# Patient Record
Sex: MALE | Race: WHITE | Employment: FULL TIME | URBAN - METROPOLITAN AREA
[De-identification: names, ages, dates, MRNs, and addresses within clinical notes are randomized per-mention and may not be internally consistent; named-entity substitution may affect disease eponyms.]

---

## 2019-10-15 PROCEDURE — 88305 TISSUE EXAM BY PATHOLOGIST: CPT | Performed by: INTERNAL MEDICINE

## 2020-01-07 ENCOUNTER — HOSPITAL ENCOUNTER (OUTPATIENT)
Dept: CT IMAGING | Age: 55
Discharge: HOME | End: 2020-01-07
Attending: INTERNAL MEDICINE

## 2020-01-07 DIAGNOSIS — Z13.6 SCREENING FOR HEART DISEASE: ICD-10-CM

## 2020-01-09 NOTE — PROGRESS NOTES
Your coronary calcium score is zero which is the best it can be. Your risk of heart disease is very low.

## 2020-01-20 PROBLEM — N45.1 LEFT EPIDIDYMITIS: Status: ACTIVE | Noted: 2020-01-20

## 2020-01-20 PROBLEM — I86.1 LEFT VARICOCELE: Status: ACTIVE | Noted: 2020-01-20

## 2020-01-20 PROBLEM — N40.1 ENLARGED PROSTATE WITH LOWER URINARY TRACT SYMPTOMS (LUTS): Status: ACTIVE | Noted: 2020-01-20

## 2020-06-18 PROBLEM — M25.512 CHRONIC LEFT SHOULDER PAIN: Status: ACTIVE | Noted: 2020-06-18

## 2020-06-18 PROBLEM — G89.29 CHRONIC LEFT SHOULDER PAIN: Status: ACTIVE | Noted: 2020-06-18

## 2020-07-08 PROBLEM — I10 ESSENTIAL HYPERTENSION: Status: ACTIVE | Noted: 2020-07-08

## 2020-12-17 PROCEDURE — 88305 TISSUE EXAM BY PATHOLOGIST: CPT | Performed by: INTERNAL MEDICINE

## 2021-11-04 PROBLEM — N45.1 LEFT EPIDIDYMITIS: Status: RESOLVED | Noted: 2020-01-20 | Resolved: 2021-11-04

## 2021-11-04 PROBLEM — N40.1 ENLARGED PROSTATE WITH LOWER URINARY TRACT SYMPTOMS (LUTS): Status: RESOLVED | Noted: 2020-01-20 | Resolved: 2021-11-04

## 2021-11-04 PROBLEM — I10 ESSENTIAL HYPERTENSION: Status: RESOLVED | Noted: 2020-07-08 | Resolved: 2021-11-04

## 2021-11-04 PROBLEM — K22.70 BARRETT'S ESOPHAGUS WITHOUT DYSPLASIA: Status: ACTIVE | Noted: 2021-11-04

## 2021-11-04 PROBLEM — I86.1 LEFT VARICOCELE: Status: RESOLVED | Noted: 2020-01-20 | Resolved: 2021-11-04

## 2022-04-29 ENCOUNTER — HOSPITAL ENCOUNTER (EMERGENCY)
Facility: HOSPITAL | Age: 57
Discharge: HOME | End: 2022-04-29
Attending: EMERGENCY MEDICINE
Payer: COMMERCIAL

## 2022-04-29 VITALS
HEART RATE: 74 BPM | OXYGEN SATURATION: 95 % | WEIGHT: 198 LBS | RESPIRATION RATE: 17 BRPM | SYSTOLIC BLOOD PRESSURE: 147 MMHG | BODY MASS INDEX: 28.35 KG/M2 | HEIGHT: 70 IN | TEMPERATURE: 98 F | DIASTOLIC BLOOD PRESSURE: 94 MMHG

## 2022-04-29 DIAGNOSIS — R07.89 CHEST PAIN, ATYPICAL: ICD-10-CM

## 2022-04-29 DIAGNOSIS — R03.0 ELEVATED BLOOD PRESSURE READING WITHOUT DIAGNOSIS OF HYPERTENSION: Primary | ICD-10-CM

## 2022-04-29 LAB
ANION GAP SERPL CALC-SCNC: 7 MMOL/L (ref 0–18)
BASOPHILS # BLD AUTO: 0.05 X10(3) UL (ref 0–0.2)
BASOPHILS NFR BLD AUTO: 0.7 %
BUN BLD-MCNC: 15 MG/DL (ref 7–18)
BUN/CREAT SERPL: 12.5 (ref 10–20)
CALCIUM BLD-MCNC: 9.7 MG/DL (ref 8.5–10.1)
CHLORIDE SERPL-SCNC: 104 MMOL/L (ref 98–112)
CO2 SERPL-SCNC: 28 MMOL/L (ref 21–32)
CREAT BLD-MCNC: 1.2 MG/DL
DEPRECATED RDW RBC AUTO: 47 FL (ref 35.1–46.3)
EOSINOPHIL # BLD AUTO: 0.14 X10(3) UL (ref 0–0.7)
EOSINOPHIL NFR BLD AUTO: 1.9 %
ERYTHROCYTE [DISTWIDTH] IN BLOOD BY AUTOMATED COUNT: 14 % (ref 11–15)
GLUCOSE BLD-MCNC: 95 MG/DL (ref 70–99)
HCT VFR BLD AUTO: 46.2 %
HGB BLD-MCNC: 15 G/DL
IMM GRANULOCYTES # BLD AUTO: 0.02 X10(3) UL (ref 0–1)
IMM GRANULOCYTES NFR BLD: 0.3 %
LYMPHOCYTES # BLD AUTO: 2.27 X10(3) UL (ref 1–4)
LYMPHOCYTES NFR BLD AUTO: 31.4 %
MCH RBC QN AUTO: 29.6 PG (ref 26–34)
MCHC RBC AUTO-ENTMCNC: 32.5 G/DL (ref 31–37)
MCV RBC AUTO: 91.3 FL
MONOCYTES # BLD AUTO: 0.68 X10(3) UL (ref 0.1–1)
MONOCYTES NFR BLD AUTO: 9.4 %
NEUTROPHILS # BLD AUTO: 4.08 X10 (3) UL (ref 1.5–7.7)
NEUTROPHILS # BLD AUTO: 4.08 X10(3) UL (ref 1.5–7.7)
NEUTROPHILS NFR BLD AUTO: 56.3 %
OSMOLALITY SERPL CALC.SUM OF ELEC: 289 MOSM/KG (ref 275–295)
PLATELET # BLD AUTO: 194 10(3)UL (ref 150–450)
POTASSIUM SERPL-SCNC: 3.6 MMOL/L (ref 3.5–5.1)
RBC # BLD AUTO: 5.06 X10(6)UL
SODIUM SERPL-SCNC: 139 MMOL/L (ref 136–145)
TROPONIN I HIGH SENSITIVITY: 4 NG/L
TROPONIN I HIGH SENSITIVITY: 4 NG/L
WBC # BLD AUTO: 7.2 X10(3) UL (ref 4–11)

## 2022-04-29 PROCEDURE — 93010 ELECTROCARDIOGRAM REPORT: CPT | Performed by: EMERGENCY MEDICINE

## 2022-04-29 PROCEDURE — 84484 ASSAY OF TROPONIN QUANT: CPT | Performed by: EMERGENCY MEDICINE

## 2022-04-29 PROCEDURE — 80048 BASIC METABOLIC PNL TOTAL CA: CPT | Performed by: EMERGENCY MEDICINE

## 2022-04-29 PROCEDURE — 85025 COMPLETE CBC W/AUTO DIFF WBC: CPT | Performed by: EMERGENCY MEDICINE

## 2022-04-29 PROCEDURE — 96361 HYDRATE IV INFUSION ADD-ON: CPT

## 2022-04-29 PROCEDURE — 99285 EMERGENCY DEPT VISIT HI MDM: CPT

## 2022-04-29 PROCEDURE — 93005 ELECTROCARDIOGRAM TRACING: CPT

## 2022-04-29 PROCEDURE — 96374 THER/PROPH/DIAG INJ IV PUSH: CPT

## 2022-04-29 RX ORDER — LABETALOL HYDROCHLORIDE 5 MG/ML
10 INJECTION, SOLUTION INTRAVENOUS ONCE
Status: COMPLETED | OUTPATIENT
Start: 2022-04-29 | End: 2022-04-29

## 2022-04-29 NOTE — ED INITIAL ASSESSMENT (HPI)
Pt reports having some Chest pain and high BP that started yesterday. Pt went to his doctor today and was told to come to the ER for more test. Pain of 5/10. Pt states he never had any problems with his BP.

## 2022-10-06 ENCOUNTER — APPOINTMENT (OUTPATIENT)
Dept: CT IMAGING | Facility: HOSPITAL | Age: 57
End: 2022-10-06
Attending: EMERGENCY MEDICINE
Payer: COMMERCIAL

## 2022-10-06 ENCOUNTER — HOSPITAL ENCOUNTER (EMERGENCY)
Facility: HOSPITAL | Age: 57
Discharge: HOME | End: 2022-10-06
Attending: EMERGENCY MEDICINE
Payer: COMMERCIAL

## 2022-10-06 ENCOUNTER — HOSPITAL ENCOUNTER (OUTPATIENT)
Age: 57
Discharge: ANOTHER HOSPITAL | End: 2022-10-06
Payer: COMMERCIAL

## 2022-10-06 VITALS
HEART RATE: 109 BPM | TEMPERATURE: 98 F | RESPIRATION RATE: 16 BRPM | SYSTOLIC BLOOD PRESSURE: 149 MMHG | OXYGEN SATURATION: 100 % | DIASTOLIC BLOOD PRESSURE: 96 MMHG

## 2022-10-06 VITALS
BODY MASS INDEX: 27.35 KG/M2 | TEMPERATURE: 98 F | RESPIRATION RATE: 18 BRPM | HEART RATE: 98 BPM | WEIGHT: 191 LBS | HEIGHT: 70 IN | DIASTOLIC BLOOD PRESSURE: 97 MMHG | OXYGEN SATURATION: 99 % | SYSTOLIC BLOOD PRESSURE: 157 MMHG

## 2022-10-06 DIAGNOSIS — R51.9 ACUTE NONINTRACTABLE HEADACHE, UNSPECIFIED HEADACHE TYPE: ICD-10-CM

## 2022-10-06 DIAGNOSIS — H53.8 BLURRED VISION: ICD-10-CM

## 2022-10-06 DIAGNOSIS — R03.0 ELEVATED BLOOD PRESSURE READING: ICD-10-CM

## 2022-10-06 DIAGNOSIS — I10 PRIMARY HYPERTENSION: Primary | ICD-10-CM

## 2022-10-06 DIAGNOSIS — R51.9 ACUTE NONINTRACTABLE HEADACHE, UNSPECIFIED HEADACHE TYPE: Primary | ICD-10-CM

## 2022-10-06 PROCEDURE — 70450 CT HEAD/BRAIN W/O DYE: CPT | Performed by: EMERGENCY MEDICINE

## 2022-10-06 PROCEDURE — 93005 ELECTROCARDIOGRAM TRACING: CPT

## 2022-10-06 PROCEDURE — 99284 EMERGENCY DEPT VISIT MOD MDM: CPT

## 2022-10-06 PROCEDURE — 93010 ELECTROCARDIOGRAM REPORT: CPT | Performed by: EMERGENCY MEDICINE

## 2022-10-06 PROCEDURE — 99214 OFFICE O/P EST MOD 30 MIN: CPT | Performed by: NURSE PRACTITIONER

## 2022-10-06 RX ORDER — AMLODIPINE BESYLATE 5 MG/1
5 TABLET ORAL DAILY
COMMUNITY
Start: 2022-09-12

## 2022-10-06 NOTE — ED INITIAL ASSESSMENT (HPI)
Pt presents to the ER from [address] [place] [address] for c/o HA, dizziness, and high blood pressure x 1 month

## 2022-10-06 NOTE — ED INITIAL ASSESSMENT (HPI)
Patient presents a&o 4/4 c/o HA, blurry visio that pt noticed ~ 2 hours ago while at dentist. States dentist took BP and it was [id number]. states there is mild improvement in symptoms. Denies having diabetes.

## 2023-03-24 ENCOUNTER — OFFICE VISIT (OUTPATIENT)
Dept: OTOLARYNGOLOGY | Facility: CLINIC | Age: 58
End: 2023-03-24

## 2023-03-24 ENCOUNTER — OFFICE VISIT (OUTPATIENT)
Dept: AUDIOLOGY | Facility: CLINIC | Age: 58
End: 2023-03-24

## 2023-03-24 VITALS — HEIGHT: 70 IN | BODY MASS INDEX: 28.63 KG/M2 | WEIGHT: 200 LBS

## 2023-03-24 DIAGNOSIS — H93.13 TINNITUS, BILATERAL: Primary | ICD-10-CM

## 2023-03-24 DIAGNOSIS — H93.13 TINNITUS OF BOTH EARS: Primary | ICD-10-CM

## 2023-03-24 DIAGNOSIS — M26.609 TMJ (TEMPOROMANDIBULAR JOINT DISORDER): ICD-10-CM

## 2023-03-24 PROCEDURE — 92557 COMPREHENSIVE HEARING TEST: CPT | Performed by: AUDIOLOGIST

## 2023-03-24 PROCEDURE — 99203 OFFICE O/P NEW LOW 30 MIN: CPT | Performed by: STUDENT IN AN ORGANIZED HEALTH CARE EDUCATION/TRAINING PROGRAM

## 2023-03-24 PROCEDURE — 3008F BODY MASS INDEX DOCD: CPT | Performed by: STUDENT IN AN ORGANIZED HEALTH CARE EDUCATION/TRAINING PROGRAM

## 2023-03-24 PROCEDURE — 92504 EAR MICROSCOPY EXAMINATION: CPT | Performed by: STUDENT IN AN ORGANIZED HEALTH CARE EDUCATION/TRAINING PROGRAM

## 2023-03-24 PROCEDURE — 92567 TYMPANOMETRY: CPT | Performed by: AUDIOLOGIST

## 2023-03-24 RX ORDER — TADALAFIL 5 MG/1
5 TABLET ORAL
COMMUNITY
Start: 2022-12-28 | End: 2023-03-28

## 2023-03-24 RX ORDER — ROSUVASTATIN CALCIUM 5 MG/1
5 TABLET, COATED ORAL NIGHTLY
COMMUNITY
Start: 2022-10-14

## 2023-03-24 RX ORDER — TAMSULOSIN HYDROCHLORIDE 0.4 MG/1
0.4 CAPSULE ORAL EVERY EVENING
Qty: 30 CAPSULE | Refills: 2 | COMMUNITY
Start: 2022-12-28 | End: 2023-03-28

## 2023-03-24 RX ORDER — LOSARTAN POTASSIUM 50 MG/1
50 TABLET ORAL DAILY
COMMUNITY
Start: 2022-10-14

## 2023-06-30 ENCOUNTER — OFFICE VISIT (OUTPATIENT)
Dept: OTOLARYNGOLOGY | Facility: CLINIC | Age: 58
End: 2023-06-30

## 2023-06-30 DIAGNOSIS — H93.13 TINNITUS OF BOTH EARS: Primary | ICD-10-CM

## 2023-06-30 DIAGNOSIS — M26.609 TMJ (TEMPOROMANDIBULAR JOINT DISORDER): ICD-10-CM

## 2023-06-30 PROCEDURE — 99213 OFFICE O/P EST LOW 20 MIN: CPT | Performed by: OTOLARYNGOLOGY

## 2023-06-30 RX ORDER — CELECOXIB 200 MG/1
200 CAPSULE ORAL DAILY PRN
Qty: 30 CAPSULE | Refills: 0 | Status: SHIPPED | OUTPATIENT
Start: 2023-06-30 | End: 2023-07-30

## 2023-06-30 RX ORDER — CYCLOBENZAPRINE HCL 5 MG
5 TABLET ORAL NIGHTLY
Qty: 30 TABLET | Refills: 1 | Status: SHIPPED | OUTPATIENT
Start: 2023-06-30

## 2024-01-09 ENCOUNTER — ORDER TRANSCRIPTION (OUTPATIENT)
Dept: ADMINISTRATIVE | Facility: HOSPITAL | Age: 59
End: 2024-01-09

## 2024-01-09 DIAGNOSIS — Z13.9 ENCOUNTER FOR SCREENING: Primary | ICD-10-CM

## 2024-01-23 ENCOUNTER — HOSPITAL ENCOUNTER (OUTPATIENT)
Dept: CT IMAGING | Age: 59
Discharge: HOME | End: 2024-01-23
Attending: INTERNAL MEDICINE

## 2024-01-23 DIAGNOSIS — Z13.9 ENCOUNTER FOR SCREENING: ICD-10-CM

## 2024-01-23 NOTE — PROGRESS NOTES
Date of Service 2024    ASHELY CASANOVA  Date of Birth 1965    Patient Age: 58 year old    PCP: Naye Vargas MD  [address] [facility]  [address]  [place] [address]    Heart Scan Consult  Preliminary Heart Scan Score: 0    Previous Screening  Heart Scan Completed Previously: Yes  Year of last heart scan: 2020  Score of last heart scan: 0  Peripheral Vascular Scan Completed Previously: No          Risk Factors  Personal Risk Factors  Non-alterable Risk Factors: Family History (Father  at 53 yo from MI.)  Alterable Risk Factors: High Blood Pressure;Abnormal Cholesterol;Lack of exercise      Body Mass Index  There is no height or weight on file to calculate BMI. BMI 28    Blood Pressure  Blood Pressure measurement declined during this encounter.  He is on medication.  (Normal =< 120/80,  Elevated = 120-129/ >80,  High Stage1 130-139/80-89 , Stage2 >140/>90)    Lipid Profile  Cholesterol: 188, done on 2021.  HDL Cholesterol: 47, done on 2021.  LDL Cholesterol: 118, done on 2021.  TriGlycerides 115, done on 2021.    23  Total - 131  LDL - 58  HDL - 49  Triglycerides - 120  He is on medication.    Cholesterol Goals  Value   Total  =< 200   HDL  = > 45 Men = > 55 Women   LDL   =< 100   Triglycerides  =< 150       Glucose and Hemoglobin A1C  Lab Results   Component Value Date    GLU 95 2022     (Normal Fasting Glucose < 100mg/dl )    Nurse Review  Risk factor information and results reviewed with Nurse: Yes    Recommended Follow Up:  Consult your physician regarding:: Final Heart Scan Report;Discuss potential for Incidental Finding      Recommendations for Change:  Nutrition Changes: Low Saturated Fat    Cholesterol Modification (goal of therapy depends upon your risk): No Change Needed    Exercise: Initiate Program    Smoking Cessation: No Change Needed    Weight Management: Decrease Current Weight    Stress Management: Adopt Stress Management Techniques    Repeat Heart  Scan: 5 years if Calcium Score is 0.0;Discuss with your Physician              [place] Recommended Resources:  Recommended Resources: PV Screening;Upcoming Classes, Medical Services and Health Library [email or web address]  Recommended PV Screening: Abdomen;Ankle-Brachial Index (IRVING);Carotids         Antonella COFFMAN RN        Please Contact the Nurse Heart Line with any Questions or Concerns [phone].

## 2024-02-15 ENCOUNTER — OFFICE VISIT (OUTPATIENT)
Dept: OTOLARYNGOLOGY | Facility: CLINIC | Age: 59
End: 2024-02-15

## 2024-02-15 VITALS — WEIGHT: 200 LBS | BODY MASS INDEX: 29 KG/M2 | TEMPERATURE: 98 F

## 2024-02-15 DIAGNOSIS — R09.82 POSTNASAL DISCHARGE: ICD-10-CM

## 2024-02-15 DIAGNOSIS — M54.2 NECK PAIN: Primary | ICD-10-CM

## 2024-02-15 PROCEDURE — 99213 OFFICE O/P EST LOW 20 MIN: CPT | Performed by: OTOLARYNGOLOGY

## 2024-02-15 RX ORDER — LORATADINE 10 MG/1
10 TABLET ORAL DAILY
Qty: 30 TABLET | Refills: 3 | Status: SHIPPED | OUTPATIENT
Start: 2024-02-15

## 2024-02-15 RX ORDER — MONTELUKAST SODIUM 10 MG/1
10 TABLET ORAL NIGHTLY
Qty: 30 TABLET | Refills: 3 | Status: SHIPPED | OUTPATIENT
Start: 2024-02-15

## 2024-02-15 RX ORDER — FLUTICASONE PROPIONATE 50 MCG
1 SPRAY, SUSPENSION (ML) NASAL 2 TIMES DAILY
Qty: 16 G | Refills: 3 | Status: SHIPPED | OUTPATIENT
Start: 2024-02-15

## 2024-02-15 NOTE — PROGRESS NOTES
Everton Cueva is a 58 year old male.    Chief Complaint   Patient presents with    Consult     Pt concerned with sinus irritation, pain in throat for a few weeks on and off.    Ear Problem     On going ringing.       HISTORY OF PRESENT ILLNESS  2012 1.  sinus symptoms (chronic)      She presents with a 1-1.5 year history of buzzing in bothe ears that comes and goes especially at night. No associated vertigo or hearingloss. She has been on z paks for sinus symptoms and is now starting to feel back to normal. Tinnitus does not appear to be affected by improvement in her sinuses.  Audiogram reveals lo normal/mild sensorineural hearing loss wih an asymmetry.   2.  ringing in ears      6/30/23 he presents with ringing sensation in his ears for the past 6 to 7 months.  Seen by Dr. COBB suspected to have TMJ issues.  Presents with continued ear discomfort headaches and ringing in his ears.  He is looking into getting a bite guard and is waiting to get it from his dentist.  Not using any medications at this time.  No other signs, symptoms or complaints.  Previous audiogram performed in March demonstrated a mild upsloping to normal downsloping to mild sensorineural hearing loss bilaterally.     2/15/24 he presents today with complaints of ear discomfort neck discomfort bilaterally thinking he has lymph nodes in his neck.  Also chronic rhinorrhea and postnasal discharge.  No other signs, symptoms or complaint      Social History     Socioeconomic History    Marital status:    Tobacco Use    Smoking status: Never    Smokeless tobacco: Never   Vaping Use    Vaping Use: Never used   Substance and Sexual Activity    Alcohol use: Yes     Comment: social    Drug use: Never       Family History   Problem Relation Age of Onset    Heart Attack Father        Past Medical History:   Diagnosis Date    Essential hypertension 7/8/2020    Left varicocele 1/20/2020       Past Surgical History:   Procedure Laterality Date    UPPER GI  ENDOSCOPY,BIOPSY  10/2019    White's; esophagitis- repeat EGD in 1 yr    UPPER GI ENDOSCOPY,EXAM  12/2020    White's - repeat 3 yrs         REVIEW OF SYSTEMS    System Neg/Pos Details   Constitutional Negative Fatigue, fever and weight loss.   ENMT Negative Drooling.   Eyes Negative Blurred vision and vision changes.   Respiratory Negative Dyspnea and wheezing.   Cardio Negative Chest pain, irregular heartbeat/palpitations and syncope.   GI Negative Abdominal pain and diarrhea.   Endocrine Negative Cold intolerance and heat intolerance.   Neuro Negative Tremors.   Psych Negative Anxiety and depression.   Integumentary Negative Frequent skin infections, pigment change and rash.   Hema/Lymph Negative Easy bleeding and easy bruising.           PHYSICAL EXAM    Temp 98.2 °F (36.8 °C) (Tympanic)   Wt 200 lb (90.7 kg)   BMI 28.70 kg/m²        Constitutional Normal Overall appearance - Normal.   Psychiatric Normal Orientation - Oriented to time, place, person & situation. Appropriate mood and affect.   Neck Exam Normal Inspection - Normal. Palpation - Normal. Parotid gland - Normal. Thyroid gland - Normal.   Eyes Normal Conjunctiva - Right: Normal, Left: Normal. Pupil - Right: Normal, Left: Normal. Fundus - Right: Normal, Left: Normal.   Neurological Normal Memory - Normal. Cranial nerves - Cranial nerves II through XII grossly intact.   Head/Face Normal Facial features - Normal. Eyebrows - Normal. Skull - Normal.        Nasopharynx Normal External nose - Normal. Lips/teeth/gums - Normal. Tonsils - Normal. Oropharynx - Normal.   Ears Normal Inspection - Right: Normal, Left: Normal. Canal - Right: Normal, Left: Normal. TM - Right: Normal, Left: Normal.   Skin Normal Inspection - Normal.        Lymph Detail Normal Submental. Submandibular. Anterior cervical. Posterior cervical. Supraclavicular.        Nose/Mouth/Throat Normal External nose - Normal. Lips/teeth/gums - Normal. Tonsils - Normal. Oropharynx postnasal  discharge with erythema   Nose/Mouth/Throat Normal Nares - Right: Normal Left: Normal. Septum -Normal  Turbinates - Right: Normal, Left: Normal.  Mucosa congested       Current Outpatient Medications:     montelukast 10 MG Oral Tab, Take 1 tablet (10 mg total) by mouth nightly., Disp: 30 tablet, Rfl: 3    loratadine 10 MG Oral Tab, Take 1 tablet (10 mg total) by mouth daily., Disp: 30 tablet, Rfl: 3    fluticasone propionate 50 MCG/ACT Nasal Suspension, 1 spray by Nasal route 2 (two) times daily., Disp: 16 g, Rfl: 3    cyclobenzaprine 5 MG Oral Tab, Take 1 tablet (5 mg total) by mouth nightly., Disp: 30 tablet, Rfl: 1    losartan 50 MG Oral Tab, Take 1 tablet (50 mg total) by mouth daily., Disp: , Rfl:     rosuvastatin 5 MG Oral Tab, Take 1 tablet (5 mg total) by mouth nightly., Disp: , Rfl:     amLODIPine 5 MG Oral Tab, Take 5 mg by mouth daily. (Patient not taking: Reported on 3/24/2023), Disp: , Rfl:     omeprazole 20 MG Oral Capsule Delayed Release, Take 1 capsule (20 mg total) by mouth every morning. (Patient not taking: Reported on 3/24/2023), Disp: 90 capsule, Rfl: 0    azithromycin (ZITHROMAX Z-JUDSON) 250 MG Oral Tab, Take two tablets today, then one tablet daily for 4 more days (Patient not taking: Reported on 4/29/2022), Disp: 6 tablet, Rfl: 0  ASSESSMENT AND PLAN    1. Neck pain    2. Postnasal discharge  Neck pain appears to be musculoskeletal.  Long history of TMJ issues.  Has been using his bite guard which is new and consistently.  I did ask him to use it every day for a month or 2 to see if this helps with some of his ear issues and neck pain.  He politely declined cyclobenzaprine and Celebrex.  I will start him on Singulair loratadine fluticasone for his congestive issues.        This note was prepared using Dragon Medical voice recognition dictation software. As a result errors may occur. When identified these errors have been corrected. While every attempt is made to correct errors during dictation  discrepancies may still exist    Brendan Jimenez MD    2/15/2024    3:28 PM

## 2024-09-05 LAB — ANA SER QL IF: NEGATIVE

## 2025-02-11 ENCOUNTER — OFFICE VISIT (OUTPATIENT)
Age: 60
End: 2025-02-11
Payer: COMMERCIAL

## 2025-02-11 DIAGNOSIS — M47.816 LUMBAR SPONDYLOSIS: ICD-10-CM

## 2025-02-11 DIAGNOSIS — M54.16 LUMBAR RADICULOPATHY: ICD-10-CM

## 2025-02-11 DIAGNOSIS — M48.061 SPINAL STENOSIS OF LUMBAR REGION, UNSPECIFIED WHETHER NEUROGENIC CLAUDICATION PRESENT: Primary | ICD-10-CM

## 2025-02-11 RX ORDER — TRAZODONE HYDROCHLORIDE 100 MG/1
100 TABLET ORAL NIGHTLY
COMMUNITY
Start: 2024-10-21 | End: 2025-10-16

## 2025-02-11 RX ORDER — METHOCARBAMOL 500 MG/1
500 TABLET, FILM COATED ORAL 2 TIMES DAILY PRN
Qty: 30 TABLET | Refills: 0 | Status: SHIPPED | OUTPATIENT
Start: 2025-02-11

## 2025-02-11 NOTE — PROGRESS NOTES
Patient: Everton Cueva  Medical Record Number: [id number]  Site: [facility]  Referring Physician:  No ref. provider found  PCP: Naye Vargas MD        HISTORY OF CHIEF COMPLAINT:    Everton Cueva is a 59 year old male, who complains of a long history of non radiating LBP.  Denies saddle anesthesia or incontinence.  Patient has a long history of non radiating back pain.  His pain was very bad over the fall he tried physical therapy and had no relief.  Then his pain improved in January 2025.  8 days ago he was cutting a bunch of meat at a  shop with friends and since then has had left buttock pain down the posterior thigh to his shin.  He has no numbness.  No weakness.  He states the pain going down the leg is fairly constant.  He has not had any recent treatment the last 8 days.  VAS Pain Score: 3 /10          Past Medical History:    Essential hypertension    Left varicocele      Past Surgical History:   Procedure Laterality Date    Upper gi endoscopy,biopsy  10/2019    White's; esophagitis- repeat EGD in 1 yr    Upper gi endoscopy,exam  12/2020    White's - repeat 3 yrs      Family History   Problem Relation Age of Onset    Heart Attack Father       Social History     Socioeconomic History    Marital status:    Tobacco Use    Smoking status: Never    Smokeless tobacco: Never   Vaping Use    Vaping status: Never Used   Substance and Sexual Activity    Alcohol use: Yes     Comment: social - rarely 1 per week    Drug use: Never      Current Medications:  Current Outpatient Medications   Medication Sig Dispense Refill    traZODone 100 MG Oral Tab Take 1 tablet (100 mg total) by mouth nightly.      methocarbamol 500 MG Oral Tab Take 1 tablet (500 mg total) by mouth 2 (two) times daily as needed. 30 tablet 0    fluticasone propionate 50 MCG/ACT Nasal Suspension 1 spray by Nasal route 2 (two) times daily. 16 g 3    losartan 50 MG Oral Tab Take 1 tablet (50 mg total) by mouth daily.       rosuvastatin 5 MG Oral Tab Take 1 tablet (5 mg total) by mouth nightly.      omeprazole 20 MG Oral Capsule Delayed Release Take 1 capsule (20 mg total) by mouth every morning. 90 capsule 0    montelukast 10 MG Oral Tab Take 1 tablet (10 mg total) by mouth nightly. (Patient not taking: Reported on 2/11/2025) 30 tablet 3    loratadine 10 MG Oral Tab Take 1 tablet (10 mg total) by mouth daily. (Patient not taking: Reported on 2/11/2025) 30 tablet 3    cyclobenzaprine 5 MG Oral Tab Take 1 tablet (5 mg total) by mouth nightly. (Patient not taking: Reported on 2/11/2025) 30 tablet 1    amLODIPine 5 MG Oral Tab Take 5 mg by mouth daily. (Patient not taking: Reported on 2/11/2025)      azithromycin (ZITHROMAX Z-JUDSON) 250 MG Oral Tab Take two tablets today, then one tablet daily for 4 more days (Patient not taking: Reported on 2/11/2025) 6 tablet 0        Work History:  The patient currently is a retired .        IMAGING STUDIES:   Images were reviewed and discussed with the patient.  They voiced understanding of what the images showed.      IMPRESSION:   .   1. 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   2. Spondylotic and degenerative disc changes as described in detail above resulting in   *  bilateral inferior foraminal stenosis L3-L4,   *  mild to moderate central canal, bilateral subarticular and bilateral mild AP foraminal stenosis   greater on the left L4-L5.     PHYSICAL EXAMIMATION   PHYSICAL EXAMINATION: Everton Cueva is a 59 year old   male who is observed sitting comfortably in the exam room alert and oriented times three. RESPIRATORY RATE: 18 He looks consistent his stated age.    There were no vitals taken for this visit.  The patient is well developed, well nourished, thin body habitus, well muscled.       Inspection: No acute distress.   Patient displays antalgic gait, and is able to normal heel walk, normal toe walk.     Coordination: Well coordinated, Fluid  gait      ROM:  Forward Flexion  Pain     Extension  Pain     Palpation:  See chart below:  Palpation of Lumbar Area Right   (POS or NEG) Left   (POS or NEG)   Lumbar paraspinals Neg Neg   SIJ Neg Neg   PSIS Neg Neg   Trochanteric Bursa Neg Neg     Strength:      DTR's:     Left Right    Left Right    EHL 5/5 5/5  Patella  2+/4 2+/4    DF 5/5 5/5  Achilles  2+/4 2+/4    PF 5/5 5/5    Quad 5/5 5/5    IP 5/5 5/5    Sensation:  Sensory deficits noted on bilateral lower extremities to light touch: none    Tests:  Test Right   (POS or NEG) Left   (POS or NEG)   SLR Neg Neg   Clonus Neg Neg      Calves supple, NT   MUSCULOSKELETAL: Fluid, pain-free ROM to bilateral: ankles, knees  & hips                                                                                     MEDICAL DECISION MAKING:   Impression: Lumbar Spine:  Lumbar Spondylosis 721.3   Lumbar Spinal Stenosis 724.02   Facet Arthrosis 721.90     Plan: We discussed the diagnosis and treatment options today, including rationale for surgical vs non-surgical options.  The patient has stenosis on his MRI at L4-5.  We reviewed this today.  He did not bring his x-rays in today but I did see the x-ray report.  I am going to hold off on giving him prednisone at this time because he has been having hypertension recently.  His primary care doctor has been following him for this.  I did give him Robaxin.  He understands the side effects of a muscle relaxer.  I gave him an MRI report.  I question whether or not he has a disc herniation at L5-S1 now.  He will follow-up with me if he has the MRI done.  His concerns were addressed.  We did discuss both an RFA and an injection.  His concerns were addressed.  Restrictions and limitations were reviewed with the patient.  Concerns were addressed.  Questions were encouraged and answered.  Patient voiced understanding.  Images were reviewed and discussed with the patient.  They voiced understanding of what the images  showed.        The patient indicates understanding of these issues and agrees to the plan.    Thank you very much.     Respectfully yours,    Ronn Garcia PA-C    This note was dictated using Dragon software. While it was briefly proofread prior to completion, some grammatical, spelling, and word choice errors due to dictation may still occur.

## 2025-02-13 ENCOUNTER — PATIENT MESSAGE (OUTPATIENT)
Age: 60
End: 2025-02-13

## 2025-04-14 ENCOUNTER — PATIENT MESSAGE (OUTPATIENT)
Age: 60
End: 2025-04-14

## 2025-04-14 DIAGNOSIS — M54.16 LUMBAR RADICULOPATHY: ICD-10-CM

## 2025-04-14 DIAGNOSIS — M48.061 SPINAL STENOSIS OF LUMBAR REGION, UNSPECIFIED WHETHER NEUROGENIC CLAUDICATION PRESENT: Primary | ICD-10-CM

## 2025-04-14 DIAGNOSIS — M47.816 LUMBAR SPONDYLOSIS: ICD-10-CM

## 2025-04-29 ENCOUNTER — HOSPITAL ENCOUNTER (OUTPATIENT)
Dept: GENERAL RADIOLOGY | Facility: HOSPITAL | Age: 60
Discharge: HOME | End: 2025-04-29
Attending: PHYSICIAN ASSISTANT
Payer: COMMERCIAL

## 2025-04-29 ENCOUNTER — OFFICE VISIT (OUTPATIENT)
Age: 60
End: 2025-04-29
Payer: COMMERCIAL

## 2025-04-29 DIAGNOSIS — M47.22 CERVICAL SPONDYLOSIS WITH RADICULOPATHY: Primary | ICD-10-CM

## 2025-04-29 DIAGNOSIS — M47.22 CERVICAL SPONDYLOSIS WITH RADICULOPATHY: ICD-10-CM

## 2025-04-29 PROCEDURE — 99213 OFFICE O/P EST LOW 20 MIN: CPT | Performed by: PHYSICIAN ASSISTANT

## 2025-04-29 PROCEDURE — 72040 X-RAY EXAM NECK SPINE 2-3 VW: CPT | Performed by: PHYSICIAN ASSISTANT

## 2025-04-29 NOTE — PROGRESS NOTES
Patient: Everton Cueva  Medical Record Number: [id number]  Site: [facility]  Referring Physician:  No ref. provider found  PCP: Naye Vargas MD       HISTORY OF CHIEF COMPLAINT:    Everton Cueva is a 59 year old male, who complains of a long h/o right-sided radiating neck pain.  Denies dropping objects or difficulty walking.  Patient has a long history of neck pain that shoots down to his right shoulder.  Recently this was made worse when he was pushing the golf cart.  He states it comes and goes every couple of days where he will get a flareup after sleeping.  He has no numbness in the arms.  No weakness in the arms.  He does feel like his flareups are getting worse.  He has tried physical therapy with minimal relief for the neck pain.    VAS Pain Score:  /10        Past Medical History[1]   Past Surgical History[2]   Family History[3]   Social Hx on file[4]   Current Medications:  Current Medications[5]       IMAGING STUDIES:  X-rays were reviewed with the patient today  X-rays  Images were reviewed and discussed with the patient.  They voiced understanding of what the images showed.      X-rays show spondylosis from C3-7.  Large anterior about bone spur noted at C5-6.  Facet joint arthropathy noted.    PHYSICAL EXAMIMATION   PHYSICAL EXAMINATION: Everton Cueva is a 59 year old   male who is observed sitting comfortably in the exam room alert and oriented times three. RESPIRATORY RATE: 18 He looks consistent his stated age.    There were no vitals taken for this visit.  The patient is well developed, well nourished, thin body habitus, well muscled.       Inspection: No acute distress.   Patient displays non-antalgic gait, and is able to normal heel walk, normal toe walk.     Coordination: Well coordinated, Fluid gait    Cervical spine:  Patient is A&O X 3 and in no apparent distress.   Neck is soft and supple with no masses or lymphadenopathy palpated.  mildly tender to palpation over the para  spinals and C7 spinous process  Skin intact with no erythema, edema or ecchymosis noted  pain with left and right rotation of neck  Spulrlings Maneuver is + to the right  Stewart's Maneuver is negative bilaterally  Smooth pain free ROM to bilateral wrist, elbow; no shoulder pain  Sensation is intact to all dermatome patterns of bilateral upper extremities  Tinel's is negative to bilateral cubital and carpal tunnels. negative Phalen's    Strength testing:   Left   Right   5/5 Deltoid  5/5 Deltoid    5/5 Biceps  5/5 Biceps   5/5 Triceps  5/5 Triceps    5/5 Ext Rot  5/5 Ext Rot   5/5 Wrist Ext  5/5 Wrist Ext   5/5 Intrinsics  5/5 Intrinsics    DTR  Left   Right   2+Biceps  2+ Biceps   2+Triceps  2+Triceps   2+Brichoradialis  2+Brachioradialis    Bilateral Lower Extremitis are negative for clonus                                                                                       MEDICAL DECISION MAKING:   Impression: Cervical spondylosis  Cervical radiculopathy    Plan: We discussed the diagnosis and treatment options today, including rationale for surgical vs non-surgical options.  The patient is continuing to have neck pain to the right shoulder.  He is going to bring in his MRI at the next visit so I can review it with him.  There is a question of whether or not he is a candidate for an injection.  He does do great with oral steroids.  We reviewed his x-rays today.  His concerns were addressed.  Follow-up after he has an MRI of his lumbar spine complete.  Restrictions and limitations were reviewed with the patient.  Concerns were addressed.  Questions were encouraged and answered.  Patient voiced understanding.  Images were reviewed and discussed with the patient.  They voiced understanding of what the images showed.      The patient indicates understanding of these issues and agrees to the plan.    Thank you very much.     Respectfully yours,    Ronn Garcia PA-C    This note was dictated using Dragon software.  While it was briefly proofread prior to completion, some grammatical, spelling, and word choice errors due to dictation may still occur.       [1]   Past Medical History:   Essential hypertension    Left varicocele   [2]   Past Surgical History:  Procedure Laterality Date    Upper gi endoscopy,biopsy  10/2019    White's; esophagitis- repeat EGD in 1 yr    Upper gi endoscopy,exam  12/2020    White's - repeat 3 yrs   [3]   Family History  Problem Relation Age of Onset    Heart Attack Father    [4]   Social History  Socioeconomic History    Marital status:    Tobacco Use    Smoking status: Never    Smokeless tobacco: Never   Vaping Use    Vaping status: Never Used   Substance and Sexual Activity    Alcohol use: Yes     Comment: social - rarely 1 per week    Drug use: Never   [5]   Current Outpatient Medications   Medication Sig Dispense Refill    predniSONE 20 MG Oral Tab Take 1 tab by mouth daily for 7 days 7 tablet 0    traZODone 100 MG Oral Tab Take 1 tablet (100 mg total) by mouth nightly.      methocarbamol 500 MG Oral Tab Take 1 tablet (500 mg total) by mouth 2 (two) times daily as needed. 30 tablet 0    montelukast 10 MG Oral Tab Take 1 tablet (10 mg total) by mouth nightly. (Patient not taking: Reported on 2/11/2025) 30 tablet 3    loratadine 10 MG Oral Tab Take 1 tablet (10 mg total) by mouth daily. (Patient not taking: Reported on 2/11/2025) 30 tablet 3    fluticasone propionate 50 MCG/ACT Nasal Suspension 1 spray by Nasal route 2 (two) times daily. 16 g 3    cyclobenzaprine 5 MG Oral Tab Take 1 tablet (5 mg total) by mouth nightly. (Patient not taking: Reported on 2/11/2025) 30 tablet 1    losartan 50 MG Oral Tab Take 1 tablet (50 mg total) by mouth daily.      rosuvastatin 5 MG Oral Tab Take 1 tablet (5 mg total) by mouth nightly.      amLODIPine 5 MG Oral Tab Take 5 mg by mouth daily. (Patient not taking: Reported on 2/11/2025)      omeprazole 20 MG Oral Capsule Delayed Release Take 1 capsule  (20 mg total) by mouth every morning. 90 capsule 0    azithromycin (ZITHROMAX Z-JUDSON) 250 MG Oral Tab Take two tablets today, then one tablet daily for 4 more days (Patient not taking: Reported on 2/11/2025) 6 tablet 0

## 2025-05-08 ENCOUNTER — PATIENT MESSAGE (OUTPATIENT)
Age: 60
End: 2025-05-08

## 2025-05-08 RX ORDER — METHOCARBAMOL 500 MG/1
500 TABLET, FILM COATED ORAL 2 TIMES DAILY PRN
Qty: 30 TABLET | Refills: 0 | Status: SHIPPED | OUTPATIENT
Start: 2025-05-08

## 2025-05-08 NOTE — TELEPHONE ENCOUNTER
Okay to send muscle relaxer to pharmacy per provider. Pt agreed.   Discussed appropriate med admin w/ patient.   Advised patient that writer will contact insurance tomorrow to check status of MRI prior auth and CB.   Patient verbalized understanding and agreement.

## 2025-05-09 NOTE — TELEPHONE ENCOUNTER
Referral specialist obtained prior auth.   See Referral # [id number]   Patient aware. Patient very grateful for referral specialists assistance.     Future Appointments   Date Time Provider Department Center   5/12/2025  1:15 PM ADO MRI RM1 (1.5T) [facility] MRI EM [place]   5/16/2025 11:00 AM Ronn Garcia PA EMG ORTH [facility] EMMG 10 [facility]

## 2025-05-12 ENCOUNTER — TELEPHONE (OUTPATIENT)
Dept: CASE MANAGEMENT | Age: 60
End: 2025-05-12

## 2025-05-12 ENCOUNTER — APPOINTMENT (OUTPATIENT)
Dept: MRI IMAGING | Facility: HOSPITAL | Age: 60
End: 2025-05-12
Attending: PHYSICIAN ASSISTANT
Payer: COMMERCIAL

## 2025-05-12 ENCOUNTER — HOSPITAL ENCOUNTER (OUTPATIENT)
Dept: MRI IMAGING | Age: 60
Discharge: HOME | End: 2025-05-12
Attending: PHYSICIAN ASSISTANT
Payer: COMMERCIAL

## 2025-05-12 PROCEDURE — 72148 MRI LUMBAR SPINE W/O DYE: CPT | Performed by: PHYSICIAN ASSISTANT

## 2025-05-12 NOTE — TELEPHONE ENCOUNTER
Called and spoke w/ patient.   [place] location advised patient that his MRI is denied. They did not let him complete his MRI.   Auth # [id number] was provided to patient.     Called [facility] [place] and spoke w/ Registration.   Advised that MRI lumbar spine is approved. Auth # [id number] is documented in referral # [id number].  Was advised to contact Central Scheduling to r/s at [facility] for today.     Called [facility] Central scheduling and r/s for 5/12/25 at 8:30pm in [place]. Arrival time 8pm.    Patient verbalized understanding and agreement. MRI appt info sent to patient via mychart msg.

## 2025-05-12 NOTE — TELEPHONE ENCOUNTER
MRI Lumbar Spine         Status: DENIED        Reference number [id number]     A copy of the denial letter is filed under the MEDIA tab, reference for complete details. You may reach out to Cecelia at [phone] to discuss decision.     Test was ordered as URGENT, patient scheduled to have service performed today, 05/12/2025 at 1:15pm.     Please reach out to patient with plan of care.      Thank you

## 2025-05-16 ENCOUNTER — OFFICE VISIT (OUTPATIENT)
Age: 60
End: 2025-05-16
Payer: COMMERCIAL

## 2025-05-16 VITALS — BODY MASS INDEX: 28.35 KG/M2 | WEIGHT: 198 LBS | HEIGHT: 70 IN

## 2025-05-16 DIAGNOSIS — M48.061 SPINAL STENOSIS OF LUMBAR REGION, UNSPECIFIED WHETHER NEUROGENIC CLAUDICATION PRESENT: Primary | ICD-10-CM

## 2025-05-16 DIAGNOSIS — M47.816 LUMBAR FACET ARTHROPATHY: ICD-10-CM

## 2025-05-16 PROCEDURE — 99213 OFFICE O/P EST LOW 20 MIN: CPT | Performed by: PHYSICIAN ASSISTANT

## 2025-05-16 PROCEDURE — 3008F BODY MASS INDEX DOCD: CPT | Performed by: PHYSICIAN ASSISTANT

## 2025-05-16 NOTE — PROGRESS NOTES
Patient: Everton Cueva  Medical Record Number: [id number]  Site: [facility]  Referring Physician:  No ref. provider found  PCP: Naye Vargas MD        HISTORY OF CHIEF COMPLAINT:    Everton Cueva is a 59 year old male, who complains of a long history of non radiating LBP.  Denies saddle anesthesia or incontinence.  Patient has a long history of non radiating back pain.  His pain was very bad over the fall he tried physical therapy and had no relief.  Then his pain improved in January 2025.  8 days ago he was cutting a bunch of meat at a  shop with friends and since then has had left buttock pain down the posterior thigh to his shin.  He has no numbness.  No weakness.  He states the pain going down the leg is fairly constant.    VAS Pain Score: 3 /10          Past Medical History:    Essential hypertension    Left varicocele      Past Surgical History:   Procedure Laterality Date    Upper gi endoscopy,biopsy  10/2019    White's; esophagitis- repeat EGD in 1 yr    Upper gi endoscopy,exam  12/2020    White's - repeat 3 yrs      Family History   Problem Relation Age of Onset    Heart Attack Father       Social History     Socioeconomic History    Marital status:    Tobacco Use    Smoking status: Never    Smokeless tobacco: Never   Vaping Use    Vaping status: Never Used   Substance and Sexual Activity    Alcohol use: Yes     Comment: social - rarely 1 per week    Drug use: Never      Current Medications:  Current Outpatient Medications   Medication Sig Dispense Refill    methocarbamol 500 MG Oral Tab Take 1 tablet (500 mg total) by mouth 2 (two) times daily as needed. 30 tablet 0    traZODone 100 MG Oral Tab Take 1 tablet (100 mg total) by mouth nightly.      fluticasone propionate 50 MCG/ACT Nasal Suspension 1 spray by Nasal route 2 (two) times daily. 16 g 3    losartan 50 MG Oral Tab Take 1 tablet (50 mg total) by mouth daily.      rosuvastatin 5 MG Oral Tab Take 1 tablet (5 mg  total) by mouth nightly.      omeprazole 20 MG Oral Capsule Delayed Release Take 1 capsule (20 mg total) by mouth every morning. 90 capsule 0    predniSONE 20 MG Oral Tab Take 1 tab by mouth daily for 7 days (Patient not taking: Reported on 5/16/2025) 7 tablet 0    montelukast 10 MG Oral Tab Take 1 tablet (10 mg total) by mouth nightly. (Patient not taking: Reported on 5/16/2025) 30 tablet 3    loratadine 10 MG Oral Tab Take 1 tablet (10 mg total) by mouth daily. (Patient not taking: Reported on 5/16/2025) 30 tablet 3    cyclobenzaprine 5 MG Oral Tab Take 1 tablet (5 mg total) by mouth nightly. (Patient not taking: Reported on 5/16/2025) 30 tablet 1    amLODIPine 5 MG Oral Tab Take 5 mg by mouth daily. (Patient not taking: Reported on 5/16/2025)      azithromycin (ZITHROMAX Z-JUDSON) 250 MG Oral Tab Take two tablets today, then one tablet daily for 4 more days (Patient not taking: Reported on 5/16/2025) 6 tablet 0        Work History:  The patient currently is a retired .        IMAGING STUDIES:   Images were reviewed and discussed with the patient.  They voiced understanding of what the images showed.      IMPRESSION:   .   1. 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   2. Spondylotic and degenerative disc changes as described in detail above resulting in   *  bilateral inferior foraminal stenosis L3-L4,   *  mild to moderate central canal, bilateral subarticular and bilateral mild AP foraminal stenosis   greater on the left L4-L5.       Narrative   PROCEDURE:  MRI SPINE LUMBAR (CPT=72148)     COMPARISON:  None.     INDICATIONS:  Worsening low back pain     TECHNIQUE:  Multiplanar T1 and T2 weighted images including fat suppression sequences.  Images acquired in sagittal and axial planes.       PATIENT STATED HISTORY: (As transcribed by Technologist)  Patient complains of worsening lower back pain he states started many years ago         FINDINGS:    LUMBAR DISC LEVELS  L1-L2:  Disc  desiccation with mild disc loss and diffuse disc bulge.  No significant central canal stenosis.  Mild facet arthrosis without significant foraminal stenosis.  L2-L3:  No significant disc/facet abnormality, spinal stenosis, or foraminal narrowing.  L3-L4:  Disc desiccation with mild disc height loss and diffuse disc bulge.  No significant central canal stenosis.  Mild facet arthrosis without significant foraminal stenosis.  L4-L5:  Disc desiccation with mild disc height loss, diffuse disc bulge, thickening of ligamentum flavum resulting in moderate central canal stenosis with minimal thecal sac diameter of 6 mm.  Advanced facet arthrosis contributes to moderate bilateral  subarticular zone and neural foraminal stenosis.  L5-S1:  No significant disc bulge or central canal stenosis.  Moderate facet arthrosis contributes to mild left-sided subarticular zone and neural foraminal stenosis.     PARASPINAL AREA:  Normal with no visible mass.    BONY STRUCTURES:  No fracture, pars defect, significant scoliosis, or osseous lesion.  Transitional lumbosacral anatomy with lumbarization of S1.  CORD/CAUDA EQUINA:  Normal caliber, contour, and signal intensity.                     Impression   CONCLUSION:       Multilevel degenerative changes of the spine.  This is most notable at L4-5 where a combination of degenerative elements contributes to moderate central canal stenosis as well as moderate bilateral subarticular zone and neural foraminal stenosis.  See  above for full detail.        LOCATION:  [place]        Dictated by (CST): Valentín Ratliff MD on 5/13/       PHYSICAL EXAMIMATION   PHYSICAL EXAMINATION: Everton Cueva is a 59 year old   male who is observed sitting comfortably in the exam room alert and oriented times three. RESPIRATORY RATE: 18 He looks consistent his stated age.    Ht 5' 10\" (1.778 m)   Wt 198 lb (89.8 kg)   BMI 28.41 kg/m²   The patient is well developed, well nourished, thin body habitus, well  muscled.       Inspection: No acute distress.   Patient displays antalgic gait, and is able to normal heel walk, normal toe walk.     Coordination: Well coordinated, Fluid gait      ROM:  Forward Flexion  Pain     Extension  Pain     Palpation:  See chart below:  Palpation of Lumbar Area Right   (POS or NEG) Left   (POS or NEG)   Lumbar paraspinals Neg Neg   SIJ Neg Neg   PSIS Neg Neg   Trochanteric Bursa Neg Neg     Strength:      DTR's:     Left Right    Left Right    EHL 5/5 5/5  Patella  2+/4 2+/4    DF 5/5 5/5  Achilles  2+/4 2+/4    PF 5/5 5/5    Quad 5/5 5/5    IP 5/5 5/5    Sensation:  Sensory deficits noted on bilateral lower extremities to light touch: none    Tests:  Test Right   (POS or NEG) Left   (POS or NEG)   SLR Neg Neg   Clonus Neg Neg      Calves supple, NT   MUSCULOSKELETAL: Fluid, pain-free ROM to bilateral: ankles, knees  & hips                                                                                     MEDICAL DECISION MAKING:   Impression: Lumbar Spine:  Lumbar Spondylosis 721.3   Lumbar Spinal Stenosis 724.02   Facet Arthrosis 721.90     Plan: We discussed the diagnosis and treatment options today, including rationale for surgical vs non-surgical options.  The patient has failed to get relief with physical therapy.  He occasionally takes a muscle relaxer for his pain at the end of the day.  He is continuing to get low back pain that occasionally radiates down his leg.  At this time I recommend an L4-5 MILAGRO.  I will send him to pain management for this.  We did review his old MRI and compared to his new MRI.  I will have him follow-up with Dr. Cehng after his injection is complete.  We did discuss a decompression today.  If he gets no relief with the MILAGRO then we can try facet injections.  Restrictions and limitations were reviewed with the patient.  Concerns were addressed.  Questions were encouraged and answered.  Patient voiced understanding.  Images were reviewed and discussed with  the patient.  They voiced understanding of what the images showed.        The patient indicates understanding of these issues and agrees to the plan.    Thank you very much.     Respectfully yours,    Ronn Garcia PA-C    This note was dictated using Dragon software. While it was briefly proofread prior to completion, some grammatical, spelling, and word choice errors due to dictation may still occur.

## 2025-05-23 ENCOUNTER — TELEPHONE (OUTPATIENT)
Dept: PAIN CLINIC | Facility: CLINIC | Age: 60
End: 2025-05-23

## 2025-05-23 ENCOUNTER — OFFICE VISIT (OUTPATIENT)
Dept: PAIN CLINIC | Facility: CLINIC | Age: 60
End: 2025-05-23
Payer: COMMERCIAL

## 2025-05-23 VITALS
BODY MASS INDEX: 28 KG/M2 | SYSTOLIC BLOOD PRESSURE: 124 MMHG | WEIGHT: 198 LBS | OXYGEN SATURATION: 96 % | HEART RATE: 90 BPM | DIASTOLIC BLOOD PRESSURE: 76 MMHG

## 2025-05-23 DIAGNOSIS — M48.062 SPINAL STENOSIS OF LUMBAR REGION WITH NEUROGENIC CLAUDICATION: Primary | ICD-10-CM

## 2025-05-23 NOTE — PROGRESS NOTES
Subjective:   Patient ID: Everton Cueva is a 59 year old male.    HPI    History/Other:   Review of Systems  Current Medications[1]  Allergies:Allergies[2]    Objective:   Physical Exam  Constitutional:          Assessment & Plan:   No diagnosis found.    No orders of the defined types were placed in this encounter.      Meds This Visit:  Requested Prescriptions      No prescriptions requested or ordered in this encounter       Imaging & Referrals:  None        Location of Pain: lumbar spine    Date Pain Began: 20 years          Work Related:   No        Receiving Work Comp/Disability:   No    Numeric Rating Scale:  Pain at Present:  2                                                                                                            (No Pain) 0  to  10 (Worst Pain)                 Minimum Pain:   0  Maximum Pain  9    Distribution of Pain:    left    Quality of Pain:   aching, sharp/stabbing, and throbbing    Origin of Pain:    Degenerative    Aggravating Factors:    Sitting and Walking    Past Treatments for Current Pain Condition:   Other chiropractor    Prior diagnostic testing for your pain:  MRI            [1]  Current Outpatient Medications   Medication Sig Dispense Refill   • methocarbamol 500 MG Oral Tab Take 1 tablet (500 mg total) by mouth 2 (two) times daily as needed. 30 tablet 0   • predniSONE 20 MG Oral Tab Take 1 tab by mouth daily for 7 days (Patient not taking: Reported on 5/16/2025) 7 tablet 0   • traZODone 100 MG Oral Tab Take 1 tablet (100 mg total) by mouth nightly.     • montelukast 10 MG Oral Tab Take 1 tablet (10 mg total) by mouth nightly. (Patient not taking: Reported on 5/16/2025) 30 tablet 3   • loratadine 10 MG Oral Tab Take 1 tablet (10 mg total) by mouth daily. (Patient not taking: Reported on 5/16/2025) 30 tablet 3   • fluticasone propionate 50 MCG/ACT Nasal Suspension 1 spray by Nasal route 2 (two) times daily. 16 g 3   • cyclobenzaprine 5 MG Oral Tab Take 1 tablet (5 mg  total) by mouth nightly. (Patient not taking: Reported on 5/16/2025) 30 tablet 1   • losartan 50 MG Oral Tab Take 1 tablet (50 mg total) by mouth daily.     • rosuvastatin 5 MG Oral Tab Take 1 tablet (5 mg total) by mouth nightly.     • amLODIPine 5 MG Oral Tab Take 5 mg by mouth daily. (Patient not taking: Reported on 5/16/2025)     • omeprazole 20 MG Oral Capsule Delayed Release Take 1 capsule (20 mg total) by mouth every morning. 90 capsule 0   • azithromycin (ZITHROMAX Z-JUDSON) 250 MG Oral Tab Take two tablets today, then one tablet daily for 4 more days (Patient not taking: Reported on 5/16/2025) 6 tablet 0   [2]  Allergies  Allergen Reactions   • Meperidine RASH

## 2025-05-23 NOTE — TELEPHONE ENCOUNTER
Pt brought in imaging of MRI Cervical Spine from [facility] 12/2/2024 on a disc. Imaging has been uploaded to [facility] P[facility] system. Disc returned to patient.

## 2025-05-23 NOTE — PATIENT INSTRUCTIONS
Refill policies:    Allow 2-3 business days for refills; controlled substances may take longer.  Contact your pharmacy at least 5 days prior to running out of medication and have them send an electronic request or submit request through the “request refill” option in your [employer] account.  Refills are not addressed on weekends; covering physicians do not authorize routine medications on weekends.  No narcotics or controlled substances are refilled after noon on Fridays or by on call physicians.  By law, narcotics must be electronically prescribed.  A 30 day supply with no refills is the maximum allowed.  If your prescription is due for a refill, you may be due for a follow up appointment.  To best provide you care, patients receiving routine medications need to be seen at least once a year.  Patients receiving narcotic/controlled substance medications need to be seen at least once every 3 months.  In the event that your preferred pharmacy does not have the requested medication in stock (e.g. Backordered), it is your responsibility to find another pharmacy that has the requested medication available.  We will gladly send a new prescription to that pharmacy at your request.    Scheduling Tests:    If your physician has ordered radiology tests such as MRI or CT scans, please contact Central Scheduling at [phone] right away to schedule the test.  Once scheduled, the [facility] Centralized Referral Team will work with your insurance carrier to obtain pre-certification or prior authorization.  Depending on your insurance carrier, approval may take 3-10 days.  It is highly recommended patients assure they have received an authorization before having a test performed.  If test is done without insurance authorization, patient may be responsible for the entire amount billed.      Precertification and Prior Authorizations:  If your physician has recommended that you have a procedure or additional testing performed the [facility]  Centralized Referral Team will contact your insurance carrier to obtain pre-certification or prior authorization.    You are strongly encouraged to contact your insurance carrier to verify that your procedure/test has been approved and is a COVERED benefit.  Although the [facility] Centralized Referral Team does its due diligence, the insurance carrier gives the disclaimer that \"Although the procedure is authorized, this does not guarantee payment.\"    Ultimately the patient is responsible for payment.   Thank you for your understanding in this matter.  Paperwork Completion:  If you require FMLA or disability paperwork for your recovery, please make sure to either drop it off or have it faxed to our office at [phone]. Be sure the form has your name and date of birth on it.  The form will be faxed to our Forms Department and they will complete it for you.  There is a 25$ fee for all forms that need to be filled out.  Please be aware there is a 10-14 day turnaround time.  You will need to sign a release of information (DEJA) form if your paperwork does not come with one.  You may call the Forms Department with any questions at [phone].  Their fax number is [phone].

## 2025-05-23 NOTE — PROGRESS NOTES
Patient: Everton Cueva  Medical Record Number: [id number]  Site: [facility]  Referring Physician: Ronn Garcia PA-C  PCP: Dr. Vargas    Dear Dr. Garcia:    Thank you very much for requesting this consultation. I had the opportunity to evaluate and initiate care for your patient today, as per your request.    HISTORY OF CHIEF COMPLAINT:      Everton Cueva is a 59 year old male, who complains of low back pain with radiating L gluteal pain.    Patient is here today at the request of spine surgery with pain in above-described distributional that began atraumatically years ago.  States that it was initially isolated to the lumbar spine, and were self limited and tolerable.  With time, this began to increase and in the autumn 2024 had been sent to physical therapy.  Pain improved by early 2025, though returned more significantly earlier this month.  States that the symptoms now associated with radiating lower extremity pain, and has had MRI as recently as 5/12/2025, as ordered by spine surgery.  This did reveal L4-5 stenosis (see below) and was sent for consideration of LESI.    VAS Pain Score:  0-8/10    Aggravating Factors: Relieving Factors:   Standing  Walking Sitting  Rest     Past Treatment Attempted/Patient’s Response:  As above    Past Medical History[1]   Past Surgical History[2]   Family History[3]   Social Hx on file[4]   Current Medications:  Current Medications[5]     Functional Assessment: Patient reports that they are able to complete all of their ADL's such as eating, bathing, using the toilet, dressing and getting up from a bed or a chair independently.    Work History:  The patient currently works full time as supervisor.       REVIEW OF SYSTEMS:   10 point review of systems is otherwise negative,unless otherwise in HPI.      Radiology/Lab Test Reviewed: MRI L-spine 5/12/2025:    LUMBAR DISC LEVELS   L1-L2:  Disc desiccation with mild disc loss and diffuse disc bulge.  No  significant central canal stenosis.  Mild facet arthrosis without significant foraminal stenosis.   L2-L3:  No significant disc/facet abnormality, spinal stenosis, or foraminal narrowing.   L3-L4:  Disc desiccation with mild disc height loss and diffuse disc bulge.  No significant central canal stenosis.  Mild facet arthrosis without significant foraminal stenosis.   L4-L5:  Disc desiccation with mild disc height loss, diffuse disc bulge, thickening of ligamentum flavum resulting in moderate central canal stenosis with minimal thecal sac diameter of 6 mm.  Advanced facet arthrosis contributes to moderate bilateral   subarticular zone and neural foraminal stenosis.   L5-S1:  No significant disc bulge or central canal stenosis.  Moderate facet arthrosis contributes to mild left-sided subarticular zone and neural foraminal stenosis.     CBC:    Lab Results   Component Value Date    WBC 7.2 04/29/2022    WBC 7.09 06/15/2020   No results found for: \"HEMOGLOBIN\"  Lab Results   Component Value Date    .0 04/29/2022     06/15/2020         PHYSICAL EXAMIMATION   PHYSICAL EXAMINATION: Everton Cueva is a 59 year old male who is observed sitting comfortably on a chair in the exam room alert and oriented times three. He looks consistent with his stated age.    Ht Readings from Last 1 Encounters:   05/16/25 70\"     Wt Readings from Last 1 Encounters:   05/23/25 198 lb (89.8 kg)     The patient is well developed, well nourished, normal body habitus, well muscled. He moves independently from sitting to standing with ease.       Inspection:  No acute distress    Patient displays Antalgic gait, and is able to Normal heel walk, Normal toe walk.    Coordination:  Well-coordinated, fluent gait, able to engage in rapid alternating movements of upper and lower extremities.    ROM Lumbar Spine:  See chart below:  Motion Right (+ or -) Left (+ or -)   Lumbar Flexion - -   Lumbar Extension - +   Lumbar Bending - -   Lumbar  Extension/ twisting motion - -     Lumbar/Sacral Integument:  Skin over lumbar sacral spine is intact without rashes, excoriations, lesions or erythema noted    Palpation:  See chart below:  Palpation of lumbar area Right (+ or -) Left (+ or -)   Lumbar facets - +   Lumbar paraspinals - -   Piriformis - -   SIJ - -   Trochanteric Bursa - -     Strength:  Strength of bilateral lower extremities grossly intact; 5/5 throughout    Sensation:  No sensory deficits noted on bilateral lower extremities to light touch    Tests:  Test Right (+ or -) Left (+ or -)   SLR - -   Fabio’s     Babinski     Clonus       HEAD/NECK: Head is normocephalic, neck supple  EYES: EOMI, EFE  SKIN EXAM: Skin is intact, head, neck, trunk and arms/legs. No rashes, mottling or ulcerations.  LYMPH EXAM: There is no lymph edema in either lower extremity.  VASCULAR EXAM: Pedal pulses are normal bilaterally, with good distal perfusion. No clubbing or cyanosis.  ABDOMINAL EXAM: Abdomen is soft without masses palpated.  HEART: normal, regular, S1 and S2  LUNGS:CTA  MUSCULOSKELETAL: Smooth, pain-free ROM to bilat hips, ankles, and knees.     Do you have any known blood/bleeding disorders?  No  Does patient currently take blood thinners?   None  Does patient currently take any antibiotics?   No      Patient is currently on pain medications:  No  Reason pain medications are prescribed: N/A  Pain medications are prescribed by: N/A  [place] Prescription Monitoring review: N/A  DIRE: N/A  Treatment decision: N/A    MEDICAL DECISION MAKING:     Impression: Lumbar stenosis with neurogenic claudication    Low back, dominantly left-sided, with radiating left gluteal pain to the proximal thigh in the setting of MRI evidence of L4-5 stenosis.  Symptoms progressively present for a number of years, though been particularly noteworthy over the last 6 months.  He did go to physical therapy at the end of 2024, to limited relief initially, though by January 2025,  pain had noticeably improved.  By April 2025, symptoms had again returned and did follow with spine surgery.  Prior to consideration of more aggressive options was sent for consideration of L4-5 MILAGRO.    On exam, does have pain with range of motion lumbar spine.  No focal sensory/motor deficits.  Negative straight leg raise.    We did discuss options, and will proceed with the requested LESI, risks and benefits of which were reviewed in detail.  Follow-up 2 to 3 weeks.    Plan: LESI (L4-5).  Follow-up 2 to 3 weeks.    The patient indicates understanding of these issues and agrees to the plan.      Thank you very much.     Respectfully yours,    JUICE Brown         [1]   Past Medical History:   Essential hypertension    Left varicocele   [2]   Past Surgical History:  Procedure Laterality Date    Upper gi endoscopy,biopsy  10/2019    White's; esophagitis- repeat EGD in 1 yr    Upper gi endoscopy,exam  12/2020    White's - repeat 3 yrs   [3]   Family History  Problem Relation Age of Onset    Heart Attack Father    [4]   Social History  Socioeconomic History    Marital status:    Tobacco Use    Smoking status: Never    Smokeless tobacco: Never   Vaping Use    Vaping status: Never Used   Substance and Sexual Activity    Alcohol use: Yes     Comment: social - rarely 1 per week    Drug use: Never   [5]   Current Outpatient Medications   Medication Sig Dispense Refill    methocarbamol 500 MG Oral Tab Take 1 tablet (500 mg total) by mouth 2 (two) times daily as needed. 30 tablet 0    traZODone 100 MG Oral Tab Take 1 tablet (100 mg total) by mouth nightly.      losartan 50 MG Oral Tab Take 1 tablet (50 mg total) by mouth daily.      rosuvastatin 5 MG Oral Tab Take 1 tablet (5 mg total) by mouth nightly.      omeprazole 20 MG Oral Capsule Delayed Release Take 1 capsule (20 mg total) by mouth every morning. 90 capsule 0    cyclobenzaprine 5 MG Oral Tab Take 1 tablet (5 mg total) by mouth nightly. (Patient  not taking: Reported on 5/23/2025) 30 tablet 1

## 2025-05-28 ENCOUNTER — TELEPHONE (OUTPATIENT)
Dept: PAIN CLINIC | Facility: CLINIC | Age: 60
End: 2025-05-28

## 2025-05-28 DIAGNOSIS — M54.16 LUMBAR RADICULITIS: Primary | ICD-10-CM

## 2025-05-28 NOTE — TELEPHONE ENCOUNTER
Prior authorization request completed for: KARI L4/5  Authorization # [id number]  Authorization dates: 5/28/25-6/26/25  CPT codes approved: 14884  Number of visits/dates of service approved: 1  Physician: helio  Location: [facility]     Patient can be scheduled. Routed to Navigator.

## 2025-05-29 NOTE — TELEPHONE ENCOUNTER
Patient advised of insurance approval to proceed with injections and is agreeable to scheduling.  pre-procedure instructions reviewed. Patient prefers Local sedation. Reviewed sedation instructions including No Fasting & No  Required. Patient encouraged but not required to hold NSAIDs for 24 hours prior to procedure. Patient verbalized understanding of instructions, no further needs at this time.       [facility] PAIN [facility]  PRE-PROCEDURE INSTRUCTIONS WITHOUT SEDATION    Procedure: KARI       Appointment Date: 06/10/2025  Check-In Time: 02:30 PM    Follow-Up Date/Time: 06/24/2025 @ 03:30 PM    Prior to the procedure:  Please update us prior to the procedure if you are experiencing any symptoms of infection such as cough, fever, chills, urinary symptoms, or have recently been prescribed antibiotics, have open wounds, have recently had surgery or dental procedures.    Day of Procedure:  **Drivers will be required for patients who receive prescriptions for Valium.    NO FASTING REQUIRED  Please bring your Insurance Card, Photo ID, List of Current Medications and Referral (if applicable) to your appointment.  Please park in the [facility] and follow the signs to the [facility].  Check in at [facility] ([address] [place] [address]) outpatient registration in the [facility].  Please note-No prescriptions will be written by Pain Clinic in OR on the day of procedure. If you require a refill of medications, please contact the office 48 hours prior to your procedure.  If you have an implanted Spinal Cord or Peripheral Nerve Stimulator: Please remember to turn device off for procedure.        Medication Hold:    Number of days you need to be off for the following medications:    Aggrenox 10 days   Agrylin (Anagrelide) 10 days  Brilinta (Ticagrelor) 7 days  Imbruvica (Ibrutinib) 3 days   Enbrel (Etanercept) 24 hours   Fragmin (Dalteparin) 24 hours   Pletal (Cilostazol) 7 days  Effient  (Prasugrel) 7 days  Pradaxa 10 days  Trental 7 days  Eliquis (Apixaban) 3 days  Xarelto (Rivaroxaban) 3 days  Lovenox (Enoxaparin) 24 hours  Aspirin  Greater than 81mg but less than 325mg   5 days  325mg and greater                  7 days  Coumadin       5 days  Procedure may be cancelled if INR is elevated.   Excedrin (with aspirin) 7 days  Plavix (Clopidogrel)                            7 days    NSAIDs: 24 hours preferred      Ibuprofen (Motrin, Advil, Vicoprofen), Naproxen (Naprosyn, Aleve), Piroxcam (Feldene), Meloxicam (Mobic), Oxaprozin (Daypro), Diclofenac (Voltaren), Indomethacin (Indocin), Etodolac (Lodine), Nabumetone (Relafen), Celebrex (Celecoxib)           HERBAL SUPPLEMENTS  5 days preferred  Fish oil, krill oil, Omega-3, Vascepa, Vitamin E, Turmeric, Garlic                       Insurance Authorization:   Most insurances are now requiring a preauthorization for all procedures.  In the event that your insurance does not authorize your procedure within 48 hours of the scheduled date, your procedure will be cancelled and rescheduled to a later date.  Please contact your insurance carrier to determine what your financial responsibility will be for the procedure(s).      Cancellation/Rescheduling Appointment:   In the event you need to cancel or reschedule your appointment, you must notify the office 24 hours prior.    Post-procedure instructions:        Please schedule a follow up visit within 2 to 4 weeks after your last procedure date   Please call our office with any questions or concerns before or after your procedure at  [phone].  If you are a diabetic, please increase the frequency of your glucose monitoring after the procedure as this may cause a temporary increase in your blood sugar.  Contact your primary care physician if your blood sugar rises as you may require some medication adjustment.  It is normal to have increased pain at injection site for up to 3-5 days after procedure, you can  use heat or ice (20 minutes on 20 minutes off) for comfort.    **To hear a recorded version of these instructions, please call [phone] and follow the prompts.  **Para escuchar las instrucciones en Español, por favor de llamar el karen [phone] opción 4.

## 2025-06-03 ENCOUNTER — OFFICE VISIT (OUTPATIENT)
Age: 60
End: 2025-06-03
Payer: COMMERCIAL

## 2025-06-03 DIAGNOSIS — M47.812 CERVICAL FACET SYNDROME: Primary | ICD-10-CM

## 2025-06-03 PROCEDURE — 99213 OFFICE O/P EST LOW 20 MIN: CPT | Performed by: PHYSICIAN ASSISTANT

## 2025-06-03 NOTE — PROGRESS NOTES
The following individual(s) verbally consented to be recorded using ambient AI listening technology and understand that they can each withdraw their consent to this listening technology at any point by asking the clinician to turn off or pause the recording:    Patient name: Everton Cueva  Additional names:           Name: Everton Cueva   MRN: [id number]  Date: 6/3/2025     HPI:   History of Present Illness  Everton Cueva is a 59 year old male who presents with worsening neck pain.    He has experienced worsening neck pain over the past three to four weeks, which disrupts his sleep. He attributes the pain to his neck and has tried using a new water pillow to alleviate discomfort.    He has been attending chiropractic sessions for the past one and a half to two weeks, with six to seven appointments. He notes significant relief after a recent adjustment. No pain radiates to the arms, and there is no numbness or tingling. However, he experiences headaches associated with muscle tension.    He has a history of vertigo, which improved significantly after a chiropractic adjustment, describing the sensation as 'the room kind of cleared up.'    An MRI of the neck done in December of the previous year showed degenerative changes and mild stenosis, but the spinal canal is open with no significant compression. The facet joints are arthritic, likely contributing to his symptoms.    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       PMH:   Past Medical History[1]    PAST SURGICAL HX:  Past Surgical History[2]    FAMILY HX:  Family History[3]    ALLERGIES:  Meperidine    MEDICATIONS: Current Medications[4]    SOCIAL HX:  Social History     Tobacco Use    Smoking status: Never    Smokeless tobacco: Never   Substance Use Topics    Alcohol use: Yes     Comment: social - rarely 1 per week       PE:     Physical Exam          Cervical spine:  Patient is A&O X 3 and in no apparent distress.   Patient can tandem walk with no difficulties.  Neck is soft and supple with no masses or lymphadenopathy palpated.  Skin intact with no errythema, edema or ecchymosis noted  ROM of cervical spine is limited with pain with motion  Spulrlings Maneuver is negative  Stewart's Maneuver is negative bilaterally  Smooth pain free ROM to bilateral shoulders, wrist, elbow  Sensation is intact to all dermatome patterns of bilateral upper extremities      Strength testing:   Left   Right   5/5 Deltoid  5/5 Deltoid    5/5 Biceps  5/5 Biceps   5/5 Triceps  5/5 Triceps    5/5 Ext Rot  5/5 Ext Rot   5/5 Wrist Ext  5/5 Wrist Ext   5/5 Intrinsics  5/5 Intrisics    DTR  Left   Right   2+Biceps  2+ Biceps   2+Triceps  2+Triceps   2+Brichoradialis  2+Brachioradialis    Bilateral Lower Extremitis are negative for clonus    Radiographic Examination/Diagnostics:  I personally viewed, independently interpreted imaging and radiology report was reviewed.  Results  RADIOLOGY  Neck MRI: Degenerative changes, mild stenosis, no significant narrowing of the spinal canal, arthritic facet joints (12/2024)  Images were reviewed and discussed with the patient.  They voiced understanding of what the images showed.    IMPRESSION: Everton Cueva is a 59 year old male   Assessment & Plan  Cervical spondylosis with facet arthrosis  Chronic neck pain with degenerative changes and mild stenosis. Facet joint pain suspected. Chiropractic care provides partial relief. No surgical indication.  He is feeling much better since getting a adjustment yesterday.  We did discuss possible facet injections if he does not continue to improve.  We also discussed acupuncture.  He will reach out to us if he wants to do acupuncture and we will send him to [facility].  Patient voiced understanding of this.  He will call with any concerns.  - Continue chiropractic care for a few more weeks.  - Consider  facet joint injections if chiropractic care is insufficient.  - Referral to [facility] for potential facet injection if needed.  - Discuss deep tissue massage for additional relief.  - Encourage exploration of acupuncture.    Lumbar spinal stenosis  Chronic lumbar spinal stenosis with recent symptom improvement. Surgery is a last resort.  - Proceed with scheduled lumbar injection next week.        Everton and EMMANUELLE went over imaging, prior treatments and arranged for a plan that incorporated personal goals, social circumstances and any current medical challenges.       ICD-10-CM    1. Cervical facet syndrome  M47.812 Pain Management Referral - In Network                 Ronn Garcia PA-C  [facility] Spine Surgery   [facility]  t: [phone]  f: [phone]    Greater than 30 minutes of total time was required in the care of the patient today. Time includes evaluation of prior and current imaging and review of records.  Note to patient: The 21st Century Cures Act makes medical notes like these available to patients in the interest of transparency. However, be advised this is a medical document. It is intended primarily as peer to peer communication, is written in medical language, and may contain abbreviations or verbiage that are unfamiliar. This document is intended to carry relevant information, facts as evident, and the clinical opinion of the practitioner at the time of writing.          [1]   Past Medical History:   Essential hypertension    Left varicocele   [2]   Past Surgical History:  Procedure Laterality Date    Upper gi endoscopy,biopsy  10/2019    White's; esophagitis- repeat EGD in 1 yr    Upper gi endoscopy,exam  12/2020    White's - repeat 3 yrs   [3]   Family History  Problem Relation Age of Onset    Heart Attack Father    [4]   Current Outpatient Medications   Medication Sig Dispense Refill    methocarbamol 500 MG Oral Tab Take 1 tablet (500 mg total) by mouth 2 (two) times daily as  needed. 30 tablet 0    traZODone 100 MG Oral Tab Take 1 tablet (100 mg total) by mouth nightly.      cyclobenzaprine 5 MG Oral Tab Take 1 tablet (5 mg total) by mouth nightly. 30 tablet 1    losartan 50 MG Oral Tab Take 1 tablet (50 mg total) by mouth daily.      rosuvastatin 5 MG Oral Tab Take 1 tablet (5 mg total) by mouth nightly.      omeprazole 20 MG Oral Capsule Delayed Release Take 1 capsule (20 mg total) by mouth every morning. 90 capsule 0

## 2025-06-10 ENCOUNTER — APPOINTMENT (OUTPATIENT)
Dept: GENERAL RADIOLOGY | Facility: HOSPITAL | Age: 60
End: 2025-06-10
Attending: ANESTHESIOLOGY
Payer: COMMERCIAL

## 2025-06-10 ENCOUNTER — HOSPITAL ENCOUNTER (OUTPATIENT)
Facility: HOSPITAL | Age: 60
Setting detail: HOSPITAL OUTPATIENT SURGERY
Discharge: HOME | End: 2025-06-10
Attending: ANESTHESIOLOGY | Admitting: ANESTHESIOLOGY
Payer: COMMERCIAL

## 2025-06-10 VITALS
RESPIRATION RATE: 16 BRPM | DIASTOLIC BLOOD PRESSURE: 81 MMHG | BODY MASS INDEX: 28.35 KG/M2 | TEMPERATURE: 98 F | HEIGHT: 70 IN | OXYGEN SATURATION: 98 % | SYSTOLIC BLOOD PRESSURE: 133 MMHG | WEIGHT: 198 LBS | HEART RATE: 78 BPM

## 2025-06-10 PROBLEM — M54.16 LUMBAR RADICULITIS: Status: ACTIVE | Noted: 2025-06-10

## 2025-06-10 PROCEDURE — 62323 NJX INTERLAMINAR LMBR/SAC: CPT | Performed by: ANESTHESIOLOGY

## 2025-06-10 RX ORDER — LIDOCAINE HYDROCHLORIDE 10 MG/ML
INJECTION, SOLUTION EPIDURAL; INFILTRATION; INTRACAUDAL; PERINEURAL
Status: DISCONTINUED | OUTPATIENT
Start: 2025-06-10 | End: 2025-06-10

## 2025-06-10 RX ORDER — DEXAMETHASONE SODIUM PHOSPHATE 10 MG/ML
INJECTION, SOLUTION INTRAMUSCULAR; INTRAVENOUS
Status: DISCONTINUED | OUTPATIENT
Start: 2025-06-10 | End: 2025-06-10

## 2025-06-10 RX ORDER — SODIUM CHLORIDE 9 MG/ML
INJECTION, SOLUTION INTRAMUSCULAR; INTRAVENOUS; SUBCUTANEOUS
Status: DISCONTINUED | OUTPATIENT
Start: 2025-06-10 | End: 2025-06-10

## 2025-06-10 RX ORDER — IOPAMIDOL 408 MG/ML
INJECTION, SOLUTION INTRATHECAL
Status: DISCONTINUED | OUTPATIENT
Start: 2025-06-10 | End: 2025-06-10

## 2025-06-10 RX ORDER — NALOXONE HYDROCHLORIDE 0.4 MG/ML
0.08 INJECTION, SOLUTION INTRAMUSCULAR; INTRAVENOUS; SUBCUTANEOUS AS NEEDED
Status: DISCONTINUED | OUTPATIENT
Start: 2025-06-10 | End: 2025-06-10

## 2025-06-10 NOTE — H&P
History & Physical Examination    Patient Name: Everton Cueva  MRN: [id number]  [facility]: [id number]  YOB: 1965    Pre-Operative Diagnosis:  Lumbar radiculitis [M54.16]    Present Illness: Lumbar radiculitis    ASA: 2  MP class: 1  Sedation: Local     Prescriptions Prior to Admission[1]  Current Hospital Medications[2]    Allergies: Allergies[3]    Past Medical History[4]  Past Surgical History[5]  Family History[6]  Social History     Tobacco Use    Smoking status: Never    Smokeless tobacco: Never   Substance Use Topics    Alcohol use: Yes     Comment: social - rarely 1 per week       SYSTEM Check if Review is Normal Check if Physical Exam is Normal If not normal, please explain:   HEENT [x ] [x ]    NECK & BACK [x ] [x ]    HEART [x ] [x ]    LUNGS [x ] [x ]    ABDOMEN [x ] [x ]    UROGENITAL [x ] [x ]    EXTREMITIES [x ] [x ]    OTHER        [ x ] I have discussed the risks and benefits and alternatives with the patient/family.  They understand and agree to proceed with plan of care.  [ x ] I have reviewed the History and Physical done within the last 30 days.  Any changes noted above.    Umesh Cortes MD              [1]   Medications Prior to Admission   Medication Sig Dispense Refill Last Dose/Taking    methocarbamol 500 MG Oral Tab Take 1 tablet (500 mg total) by mouth 2 (two) times daily as needed. 30 tablet 0     traZODone 100 MG Oral Tab Take 1 tablet (100 mg total) by mouth nightly.       cyclobenzaprine 5 MG Oral Tab Take 1 tablet (5 mg total) by mouth nightly. 30 tablet 1     losartan 50 MG Oral Tab Take 1 tablet (50 mg total) by mouth daily.       rosuvastatin 5 MG Oral Tab Take 1 tablet (5 mg total) by mouth nightly.       omeprazole 20 MG Oral Capsule Delayed Release Take 1 capsule (20 mg total) by mouth every morning. 90 capsule 0    [2]   No current facility-administered medications for this encounter.   [3]   Allergies  Allergen Reactions    Meperidine RASH   [4]   Past Medical  History:   Essential hypertension    Left varicocele   [5]   Past Surgical History:  Procedure Laterality Date    Upper gi endoscopy,biopsy  10/2019    White's; esophagitis- repeat EGD in 1 yr    Upper gi endoscopy,exam  12/2020    White's - repeat 3 yrs   [6]   Family History  Problem Relation Age of Onset    Heart Attack Father

## 2025-06-10 NOTE — OPERATIVE REPORT
[facility]  Operative Report  6/10/2025     Everton Cueva Patient Status:  Hospital Outpatient Surgery    1965 MRN [id number]   Location [facility]IN [facility] Attending Umesh Cortes MD   Hosp Day # 0 PCP Naye Vargas MD     Indication: Everton is a 59 year old male with lumbar radiculitis    Preoperative Diagnosis:  Lumbar radiculitis [M54.16]    Postoperative Diagnosis: Same as above.    Procedure performed: LUMBAR INTERLAMINAR EPIDURAL INJECTION with local    Anesthesia: Local      EBL: Less than 1 ml.    Procedure Description:  After reviewing the patient's history and performing a focused physical examination, the diagnosis and positive previous diagnostic tests were confirmed and contraindications such as infection and coagulopathy were ruled out.  Following review of allergies and potential side effects and complications, including but not necessarily limited to infection, allergic reaction, local tissue breakdown, nerve injury, post-dural puncture headache and paresis, the patient consented for the procedure.    The patient was brought to the procedure room in prone position.  After sterile prep of the lumbar spine, the L4-L5 interspace was identified with the help of fluoroscopy. Local anesthetic was given by a 25 gauge 1.5 inch needle with 1% lidocaine in that space level.  Thereafter, a 20 gauge Tuohy needle was introduced and advanced under fluoroscopy.  The epidural space was accessed by using loss of resistance to air technique.  The needle position was confirmed with AP and lateral view under fluoroscopy.  Omnipaque 180 was injected in 1 mL volume. A good epidurogram was obtained.  Thereafter, dexamethasone 10 mg with normal saline of 5 mL in total volume of 6 mL was injected through the Tuohy needle.  The needle was flushed with 1 mL lidocaine.  The needle was withdrawn with the stylet intact in situ.  The needle's tip was intact.  The patient tolerated the  procedure very well without significant immediate complication.  The patient's back was cleaned and sterile dressing was applied.  The patient was discharged with an instruction to a responsible adult after discharge criteria were met.  The patient was advised to contact us should any problems happen.  The patient was also informed to go to the emergency room immediately if experiencing severe numbness or weakness in the extremities or experiencing bowel or bladder incontinence.            Complications: None.    Follow up: The patient was followed in the pain clinic as needed basis.          Umesh Cortes MD

## 2025-06-11 ENCOUNTER — TELEPHONE (OUTPATIENT)
Dept: PAIN CLINIC | Facility: CLINIC | Age: 60
End: 2025-06-11

## 2025-06-11 NOTE — TELEPHONE ENCOUNTER
Courtesy called placed to patient for post procedure follow up. Patient stated doing very good today. Informed patient that soreness is to be expected after the procedure. Educated patient that it takes 3-5 days for the steroid to be effective and to allow adequate time for medication to work. Encouraged patient to alternate ice and heat and to take medications as prescribed. Pt verbalized understanding to call with any questions or concerns.      Procedure: LUMBAR INTERLAMINAR EPIDURAL INJECTION with local   Date: 06/10/25  Follow up Visit Scheduled: 06/24/25

## 2025-06-24 ENCOUNTER — OFFICE VISIT (OUTPATIENT)
Dept: PAIN CLINIC | Facility: CLINIC | Age: 60
End: 2025-06-24
Payer: COMMERCIAL

## 2025-06-24 VITALS
HEART RATE: 65 BPM | BODY MASS INDEX: 28 KG/M2 | WEIGHT: 197 LBS | OXYGEN SATURATION: 99 % | SYSTOLIC BLOOD PRESSURE: 130 MMHG | DIASTOLIC BLOOD PRESSURE: 64 MMHG

## 2025-06-24 DIAGNOSIS — M48.062 SPINAL STENOSIS OF LUMBAR REGION WITH NEUROGENIC CLAUDICATION: Primary | ICD-10-CM

## 2025-06-24 PROCEDURE — 3075F SYST BP GE 130 - 139MM HG: CPT | Performed by: PHYSICIAN ASSISTANT

## 2025-06-24 PROCEDURE — 99214 OFFICE O/P EST MOD 30 MIN: CPT | Performed by: PHYSICIAN ASSISTANT

## 2025-06-24 PROCEDURE — 3078F DIAST BP <80 MM HG: CPT | Performed by: PHYSICIAN ASSISTANT

## 2025-06-24 NOTE — PROGRESS NOTES
Last procedure: LUMBAR INTERLAMINAR EPIDURAL INJECTION with local   Date: 6/10/25  Percentage of relief obtained: 25-30%  Duration of relief: up until now    Current Pain Score: 1-2/10    Narcotic Contract Exp: n/a

## 2025-06-24 NOTE — PROGRESS NOTES
HPI:   Everton Cueva presents with complaints of Low back pain radiating to L gluteal pain.    The pain is described as moderate aching that is intermittent.  The patient’s activity level has increased since last visit.  The pain is worst unrelated to time of day.    Changes in condition/history since last visit: Patient is here today for follow-up after LESI #1 on 6/10/2025.  Procedure was well-tolerated and had no adverse effects.  Overall, reports mild relief, and is ~30% improved.  He has been better able to tolerate ADLs, though pain is certainly still present.      Last procedure: LESI #1    date: 6/10/2025    Percentage of relief experienced from the procedure: 30%    Duration of the relief: sustained     The following activities will increase the patient’s pain: walking, standing    The following activities decrease the patient’s pain: limiting activity level    Functional Assessment: Patient reports that they are able to complete all of their ADL's such as eating, bathing, using the toilet, dressing and getting up from a bed or a chair independently.    Current Medications:  Current Medications[1]   Patient requires assistance with: No assistance required    Reviewed Patient History Dated: 6/10/25 no changes noted    Physical Exam:   There were no vitals taken for this visit.  VAS Pain Score:  1-2/10  General Appearance: Well developed, well nourished, normal build, independent body habitus, no apparent physical disabilities, well groomed    Neurological Exam: WNL-Orientation to time, place and person, normal mood & effect, normal concentration & attention span  Inspection: Nonantalgic, no acute distress  Radiology/Lab Test Reviewed: MRI L-spine 5/12/2025:     LUMBAR DISC LEVELS   L1-L2:  Disc desiccation with mild disc loss and diffuse disc bulge.  No significant central canal stenosis.  Mild facet arthrosis without significant foraminal stenosis.   L2-L3:  No significant disc/facet abnormality, spinal  stenosis, or foraminal narrowing.   L3-L4:  Disc desiccation with mild disc height loss and diffuse disc bulge.  No significant central canal stenosis.  Mild facet arthrosis without significant foraminal stenosis.   L4-L5:  Disc desiccation with mild disc height loss, diffuse disc bulge, thickening of ligamentum flavum resulting in moderate central canal stenosis with minimal thecal sac diameter of 6 mm.  Advanced facet arthrosis contributes to moderate bilateral subarticular zone and neural foraminal stenosis.   L5-S1:  No significant disc bulge or central canal stenosis.  Moderate facet arthrosis contributes to mild left-sided subarticular zone and neural foraminal stenosis.    Lab Results   Component Value Date    WBC 7.2 04/29/2022    WBC 7.09 06/15/2020   No results found for: \"HEMOGLOBIN\"  Lab Results   Component Value Date    .0 04/29/2022     06/15/2020     Do you have any known blood/bleeding disorders?  No  Does patient currently take blood thinners?   None  Does patient currently take any antibiotics?   No  Patient educated and verbalized understanding.  Medical Decision Making:   Diagnosis:    Encounter Diagnosis   Name Primary?    Spinal stenosis of lumbar region with neurogenic claudication Yes     Impression: Mild relief with initial LESI reporting 30% improvement.  We did discuss potentially repeating procedure, he is scheduled to follow-up with his surgeon.  Prior to can sideration of repeat MILAGRO, wanted to seek his opinion, as patient is considering surgery.  I did place order for LESI #2 in the event that spine surgery asks for repeat injection prior to proceeding with surgery.    Plan: Patient to follow up PRN.  Order in for LESI #2 in the event that surgery asks him to consider it.  If the plan is to go directly to surgery, would obviously hold off on additional injections.    No orders of the defined types were placed in this encounter.      Meds & Refills for this  Visit:  Requested Prescriptions      No prescriptions requested or ordered in this encounter       Imaging & Consults:  None    The patient indicates understanding of these issues and agrees to the plan.    JUICE Brown           [1]   Current Outpatient Medications   Medication Sig Dispense Refill    methocarbamol 500 MG Oral Tab Take 1 tablet (500 mg total) by mouth 2 (two) times daily as needed. 30 tablet 0    traZODone 100 MG Oral Tab Take 1 tablet (100 mg total) by mouth nightly.      losartan 50 MG Oral Tab Take 1 tablet (50 mg total) by mouth daily.      rosuvastatin 5 MG Oral Tab Take 1 tablet (5 mg total) by mouth nightly.      omeprazole 20 MG Oral Capsule Delayed Release Take 1 capsule (20 mg total) by mouth every morning. 90 capsule 0    cyclobenzaprine 5 MG Oral Tab Take 1 tablet (5 mg total) by mouth nightly. (Patient not taking: Reported on 6/24/2025) 30 tablet 1

## 2025-06-30 ENCOUNTER — TELEPHONE (OUTPATIENT)
Dept: PAIN CLINIC | Facility: CLINIC | Age: 60
End: 2025-06-30

## 2025-06-30 NOTE — TELEPHONE ENCOUNTER
Plan: Patient to follow up PRN.  Order in for LESI #2 in the event that surgery asks him to consider it.  If the plan is to go directly to surgery, would obviously hold off on additional injections.        The patient indicates understanding of these issues and agrees to the plan.     JUICE Brown

## (undated) DEVICE — REMOVER LOT 4OZ N IRRIG UNSCNT SFT MOIST LIQ

## (undated) DEVICE — BANDAGE ADH 1INX3IN NAT FAB N ADH PD CURAD

## (undated) DEVICE — AVANOS* TUOHY EPIDURAL NEEDLE: Brand: AVANOS

## (undated) DEVICE — SYRINGE 10ML SLIP TIP LOSS OF RESIST PLAS

## (undated) DEVICE — GLOVE SUR 7.5 SENSICARE PIP WHT PWD F

## (undated) DEVICE — GLOVE,SURG,SENSICARE,ALOE,LF,PF,7: Brand: MEDLINE

## (undated) DEVICE — SKIN REG/FINE DUAL MARKER, RULER, LABELS: Brand: MEDLINE

## (undated) DEVICE — PAIN TRAY: Brand: MEDLINE INDUSTRIES, INC.